# Patient Record
Sex: FEMALE | Race: WHITE
[De-identification: names, ages, dates, MRNs, and addresses within clinical notes are randomized per-mention and may not be internally consistent; named-entity substitution may affect disease eponyms.]

---

## 2018-03-24 ENCOUNTER — HEALTH MAINTENANCE LETTER (OUTPATIENT)
Age: 78
End: 2018-03-24

## 2018-09-18 ENCOUNTER — THERAPY VISIT (OUTPATIENT)
Dept: PHYSICAL THERAPY | Facility: CLINIC | Age: 78
End: 2018-09-18
Payer: MEDICARE

## 2018-09-18 DIAGNOSIS — G89.29 CHRONIC BILATERAL LOW BACK PAIN WITHOUT SCIATICA: ICD-10-CM

## 2018-09-18 DIAGNOSIS — M54.50 CHRONIC BILATERAL LOW BACK PAIN WITHOUT SCIATICA: ICD-10-CM

## 2018-09-18 PROCEDURE — G8978 MOBILITY CURRENT STATUS: HCPCS | Mod: GP | Performed by: PHYSICAL THERAPIST

## 2018-09-18 PROCEDURE — 97530 THERAPEUTIC ACTIVITIES: CPT | Mod: GP | Performed by: PHYSICAL THERAPIST

## 2018-09-18 PROCEDURE — 97110 THERAPEUTIC EXERCISES: CPT | Mod: GP | Performed by: PHYSICAL THERAPIST

## 2018-09-18 PROCEDURE — 97161 PT EVAL LOW COMPLEX 20 MIN: CPT | Mod: GP | Performed by: PHYSICAL THERAPIST

## 2018-09-18 PROCEDURE — G8979 MOBILITY GOAL STATUS: HCPCS | Mod: GP | Performed by: PHYSICAL THERAPIST

## 2018-09-18 NOTE — LETTER
DEPARTMENT OF HEALTH AND HUMAN SERVICES  CENTERS FOR MEDICARE & MEDICAID SERVICES    PLAN/UPDATED PLAN OF PROGRESS FOR OUTPATIENT REHABILITATION    PATIENTS NAME:  Sharmila Doty   : 1940    PROVID[facility] NUMBER:    [id number]  [facility]  [id number]     PROVIDER NAME: [place] [facility]  MEDICAL RECORD NUMBER: [id number]     START OF CARE DATE:  SOC Date: 18   TYPE:  PT    PRIMARY/TREATMENT DIAGNOSIS: (Pertinent Medical Diagnosis)  Chronic bilateral low back pain without sciatica    VISITS FROM START OF CARE:   1     [facility] Initial Evaluation -- Lumbar  Date: 2018  Sharmila Doty is a 78 year old female with a lumbar spine condition.   Referral: GP  Work mechanical stresses:  NA  Employment status:  Retired  Leisure mechanical stresses: Exercise class at [employer] 2 x week (has not done the past month due to noticing more pain during)  Functional disability score (JERRY/STarT Back):  See JERRY/STarT Back flow sheets  VAS score (0-10): 10/10 at worst  Patient goals/expectations:  Reduce pain in back and be able to return back to exercise class.    HISTORY:  Present symptoms: (B) low back  Pain quality (sharp/shooting/stabbing/aching/burning/cramping):  sharp   Paresthesia (yes/no):  No  Present since (onset date): 2018.  MD referral date 18 .     Symptoms (improving/unchanging/worsening):  worsening.   Symptoms commenced as a result of: No apparent reason   Condition occurred in the following environment:   Home   Symptoms at onset (back/thigh/leg): central low back  Constant symptoms (back/thigh/leg): None  Intermittent symptoms (back/thigh/leg): back  Symptoms are made worse with the following: Always Bending, Always Rising, Always Standing and Time of day - Always AM and Always PM   Symptoms are made better with the following: Other - heat/ice sometimes  Disturbed sleep (yes/no):  Yes   Sleeping postures  (prone/sup/side R/L): Lt side  Previous episodes (0/1-5/6-10/11+): 1   Year of first episode: 2015  Previous history: Yes  Previous treatments: PT 2015 - Ext responder  PATIENTS NAME:  Sharmila Doty   : 1940  PRIMARY/TREATMENT DIAGNOSIS: (Pertinent Medical Diagnosis)  Chronic bilateral low back pain without sciatica    Specific Questions:  Cough/Sneeze/Strain (pos/neg): Neg  Bowel/Bladder (normal/abnormal): Abnormal - Increased frequency and blood in urine but is following this with MD  Gait (normal/abnormal): Normal  Medications (nil/NSAIDS/analg/steroids/anticoag/other):  Muscle relaxants and Anticoag  Medical allergies:  Codeine  General health (excellent/good/fair/poor):  Excellent  Pertinent medical history:  Overweight and Calf pain, swelling, warmth (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)  Imaging (None/Xray/MRI/Other):  X-ray  Recent or major surgery (yes/no):  No  Night pain (yes/no): No  Accidents (yes/no): No  Unexplained weight loss (yes/no): No  Barriers at home: None  Other red flags: None    EXAMINATION    Posture:   Sitting (good/fair/poor): Fair  Standing (good/fair/poor):Fair  Lordosis (red/acc/normal): Reduced  Correction of posture (better/worse/no effect): Better  Lateral Shift (right/left/nil): Nil  Relevant (yes/no):  No  Other Observations: NA    Neurological:  Motor deficit:  NT  Reflexes:  NT  Sensory deficit:  NT  Dural signs:  Neg slump (B)    Movement Loss:   Rohit Mod Min Nil Pain   Flexion    X ERP   Extension  X   ERP   Side Gliding R  X   ERP   Side Gliding L  X   ERP     Test Movements:   During: produces, abolishes, increases, decreases, no effect, centralizing, peripheralizing   After: better, worse, no better, no worse, no effect, centralized, peripheralized          PATIENTS NAME:  Sharmila Doty   : 1940  PRIMARY/TREATMENT DIAGNOSIS: (Pertinent Medical Diagnosis)  Chronic bilateral low back pain without sciatica    Pretest  symptoms standin-3/10 (B) LB   Symptoms During Symptoms After ROM increased ROM decreased No Effect   FISit Increases    No Worse         Rep FISit Increases    Worse     X    EIS Increases    No Worse         Rep EIS Increases    No Worse    X     Pretest symptoms lying:     Symptoms During Symptoms After ROM increased ROM decreased No Effect   ALEX        Rep ALEX        EIL        Rep EIL        If required, pretest symptoms:    Symptoms During Symptoms After ROM increased ROM decreased No Effect   SGIS - R        Rep SGIS - R        SGIS - L        Rep SGIS - L          Static Tests:  Sitting slouched:    Sitting erect:    Standing slouched   Standing erect:    Lying prone in extension:   Long sitting:      Other Tests: NT    Provisional Classification:  Inconclusive/Other - Testing repeated EIS    Principle of Management:  Education:  Posture, specificity of exercise     Equipment provided:  None.  Use of rolled towel for posture correction  Mechanical therapy (Y/N):  Y   Extension principle:  EIS w/back against counter x 10-15 4-5 x daily                  PATIENTS NAME:  Sharmila Doty   : 1940  PRIMARY/TREATMENT DIAGNOSIS: (Pertinent Medical Diagnosis)  Chronic bilateral low back pain without sciatica    ASSESSMENT/PLAN:  Patient is a 78 year old female with lumbar complaints.    Patient has the following significant findings with corresponding treatment plan.                Diagnosis 1:  LBP  Pain -  self management, education, directional preference exercise and home program  Decreased ROM/flexibility - manual therapy, therapeutic exercise and home program  Decreased joint mobility - manual therapy, therapeutic exercise and home program  Decreased strength - therapeutic exercise, therapeutic activities and home program  Impaired muscle performance - neuro re-education and home program  Decreased function - therapeutic activities and home program  Impaired posture - neuro re-education and home  program    Therapy Evaluation Codes:   1) History comprised of:   Personal factors that impact the plan of care:      None.    Comorbidity factors that impact the plan of care are:      Overweight.     Medications impacting care: Heparin/coumadin and Muscle relaxant.  2) Examination of Body Systems comprised of:   Body structures and functions that impact the plan of care:      Lumbar spine.   Activity limitations that impact the plan of care are:      Bending, Standing, Walking and Rising from chair.  3) Clinical presentation characteristics are:   Evolving/Changing.  4) Decision-Making    Low complexity using standardized patient assessment instrument and/or   measureable assessment of functional outcome.  Cumulative Therapy Evaluation is: Low complexity.    Previous and current functional limitations:  (See Goal Flow Sheet for this information)    Short term and Long term goals: (See Goal Flow Sheet for this information)   Communication ability:  Patient appears to be able to clearly communicate and understand verbal and written communication and follow directions correctly.  Treatment Explanation - The following has been discussed with the patient:   RX ordered/plan of care, Anticipated outcomes, Possible risks and side effects                        PATIENTS NAME:  Sharmila Doty   : 1940  PRIMARY/TREATMENT DIAGNOSIS: (Pertinent Medical Diagnosis)  Chronic bilateral low back pain without sciatica    This patient would benefit from PT intervention to resume normal activities.   Rehab potential is good.  Frequency:  1 X week, once daily  Duration:  for 6 weeks  Discharge Plan:  Achieve all LTG.  Independent in home treatment program.  Reach maximal therapeutic benefit.          Caregiver Signature/Credentials _____________________________ Date ________         JOEL TreadwellT, Cert MDT     I have reviewed and certified the need for these services and plan of treatment while under my care.     "    PHYSICIAN'S SIGNATURE:   _________________________________________      Date___________   Familia Shine MD    Certification period:  Beginning of Cert date period: 09/18/18 to 12/16/18     Functional Level Progress Report: Please see attached \"Goal Flow sheet for Functional level.\"    ____X____ Continue Services or       ________ DC Services                Service dates: From  SOC Date: 09/18/18 to present                         "

## 2018-09-18 NOTE — PROGRESS NOTES
[facility] Initial Evaluation -- Lumbar    Date: September 18, 2018  Sharmila Doty is a 78 year old female with a lumbar spine condition.   Referral: GP  Work mechanical stresses:  NA  Employment status:  Retired  Leisure mechanical stresses: Exercise class at [employer] 2 x week (has not done the past month due to noticing more pain during)  Functional disability score (JERRY/STarT Back):  See JERRY/STarT Back flow sheets  VAS score (0-10): 10/10 at worst  Patient goals/expectations:  Reduce pain in back and be able to return back to exercise class.    HISTORY:    Present symptoms: (B) low back  Pain quality (sharp/shooting/stabbing/aching/burning/cramping):  sharp   Paresthesia (yes/no):  No    Present since (onset date): Jan 2018.  MD referral date 9.7.18 .     Symptoms (improving/unchanging/worsening):  worsening.     Symptoms commenced as a result of: No apparent reason   Condition occurred in the following environment:   Home     Symptoms at onset (back/thigh/leg): central low back  Constant symptoms (back/thigh/leg): None  Intermittent symptoms (back/thigh/leg): back    Symptoms are made worse with the following: Always Bending, Always Rising, Always Standing and Time of day - Always AM and Always PM   Symptoms are made better with the following: Other - heat/ice sometimes    Disturbed sleep (yes/no):  Yes Sleeping postures (prone/sup/side R/L): Lt side    Previous episodes (0/1-5/6-10/11+): 1 Year of first episode: 2015    Previous history: Yes  Previous treatments: PT 2015 - Ext responder      Specific Questions:  Cough/Sneeze/Strain (pos/neg): Neg  Bowel/Bladder (normal/abnormal): Abnormal - Increased frequency and blood in urine but is following this with MD  Gait (normal/abnormal): Normal  Medications (nil/NSAIDS/analg/steroids/anticoag/other):  Muscle relaxants and Anticoag  Medical allergies:  Codeine  General health (excellent/good/fair/poor):  Excellent  Pertinent medical  history:  Overweight and Calf pain, swelling, warmth (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)  Imaging (None/Xray/MRI/Other):  X-ray  Recent or major surgery (yes/no):  No  Night pain (yes/no): No  Accidents (yes/no): No  Unexplained weight loss (yes/no): No  Barriers at home: None  Other red flags: None    EXAMINATION    Posture:   Sitting (good/fair/poor): Fair  Standing (good/fair/poor):Fair  Lordosis (red/acc/normal): Reduced  Correction of posture (better/worse/no effect): Better    Lateral Shift (right/left/nil): Nil  Relevant (yes/no):  No  Other Observations: NA    Neurological:    Motor deficit:  NT  Reflexes:  NT  Sensory deficit:  NT  Dural signs:  Neg slump (B)    Movement Loss:   Rohit Mod Min Nil Pain   Flexion    X ERP   Extension  X   ERP   Side Gliding R  X   ERP   Side Gliding L  X   ERP     Test Movements:   During: produces, abolishes, increases, decreases, no effect, centralizing, peripheralizing   After: better, worse, no better, no worse, no effect, centralized, peripheralized    Pretest symptoms standin-3/10 (B) LB   Symptoms During Symptoms After ROM increased ROM decreased No Effect   FISit Increases    No Worse         Rep FISit Increases    Worse     X    EIS Increases    No Worse         Rep EIS Increases    No Worse    X     Pretest symptoms lying:     Symptoms During Symptoms After ROM increased ROM decreased No Effect   ALEX        Rep ALEX        EIL        Rep EIL        If required, pretest symptoms:    Symptoms During Symptoms After ROM increased ROM decreased No Effect   SGIS - R        Rep SGIS - R        SGIS - L        Rep SGIS - L          Static Tests:  Sitting slouched:    Sitting erect:    Standing slouched   Standing erect:    Lying prone in extension:   Long sitting:      Other Tests: NT    Provisional Classification:  Inconclusive/Other - Testing repeated EIS    Principle of Management:  Education:  Posture, specificity of  exercise   Equipment provided:  None.  Use of rolled towel for posture correction  Mechanical therapy (Y/N):  Y   Extension principle:  EIS w/back against counter x 10-15 4-5 x daily    ASSESSMENT/PLAN:    Patient is a 78 year old female with lumbar complaints.    Patient has the following significant findings with corresponding treatment plan.                Diagnosis 1:  LBP    Pain -  self management, education, directional preference exercise and home program  Decreased ROM/flexibility - manual therapy, therapeutic exercise and home program  Decreased joint mobility - manual therapy, therapeutic exercise and home program  Decreased strength - therapeutic exercise, therapeutic activities and home program  Impaired muscle performance - neuro re-education and home program  Decreased function - therapeutic activities and home program  Impaired posture - neuro re-education and home program    Therapy Evaluation Codes:   1) History comprised of:   Personal factors that impact the plan of care:      None.    Comorbidity factors that impact the plan of care are:      Overweight.     Medications impacting care: Heparin/coumadin and Muscle relaxant.  2) Examination of Body Systems comprised of:   Body structures and functions that impact the plan of care:      Lumbar spine.   Activity limitations that impact the plan of care are:      Bending, Standing, Walking and Rising from chair.  3) Clinical presentation characteristics are:   Evolving/Changing.  4) Decision-Making    Low complexity using standardized patient assessment instrument and/or measureable assessment of functional outcome.  Cumulative Therapy Evaluation is: Low complexity.    Previous and current functional limitations:  (See Goal Flow Sheet for this information)    Short term and Long term goals: (See Goal Flow Sheet for this information)     Communication ability:  Patient appears to be able to clearly communicate and understand verbal and written  communication and follow directions correctly.  Treatment Explanation - The following has been discussed with the patient:   RX ordered/plan of care  Anticipated outcomes  Possible risks and side effects  This patient would benefit from PT intervention to resume normal activities.   Rehab potential is good.    Frequency:  1 X week, once daily  Duration:  for 6 weeks  Discharge Plan:  Achieve all LTG.  Independent in home treatment program.  Reach maximal therapeutic benefit.    Please refer to the daily flowsheet for treatment today, total treatment time and time spent performing 1:1 timed codes.

## 2018-09-19 PROBLEM — M54.50 CHRONIC BILATERAL LOW BACK PAIN WITHOUT SCIATICA: Status: ACTIVE | Noted: 2018-09-19

## 2018-09-19 PROBLEM — G89.29 CHRONIC BILATERAL LOW BACK PAIN WITHOUT SCIATICA: Status: ACTIVE | Noted: 2018-09-19

## 2018-09-25 ENCOUNTER — THERAPY VISIT (OUTPATIENT)
Dept: PHYSICAL THERAPY | Facility: CLINIC | Age: 78
End: 2018-09-25
Payer: MEDICARE

## 2018-09-25 DIAGNOSIS — M54.50 CHRONIC BILATERAL LOW BACK PAIN WITHOUT SCIATICA: ICD-10-CM

## 2018-09-25 DIAGNOSIS — G89.29 CHRONIC BILATERAL LOW BACK PAIN WITHOUT SCIATICA: ICD-10-CM

## 2018-09-25 PROCEDURE — 97530 THERAPEUTIC ACTIVITIES: CPT | Mod: GP | Performed by: PHYSICAL THERAPIST

## 2018-09-25 PROCEDURE — 97110 THERAPEUTIC EXERCISES: CPT | Mod: GP | Performed by: PHYSICAL THERAPIST

## 2018-10-09 ENCOUNTER — THERAPY VISIT (OUTPATIENT)
Dept: PHYSICAL THERAPY | Facility: CLINIC | Age: 78
End: 2018-10-09
Payer: MEDICARE

## 2018-10-09 DIAGNOSIS — M54.50 CHRONIC BILATERAL LOW BACK PAIN WITHOUT SCIATICA: ICD-10-CM

## 2018-10-09 DIAGNOSIS — G89.29 CHRONIC BILATERAL LOW BACK PAIN WITHOUT SCIATICA: ICD-10-CM

## 2018-10-09 PROCEDURE — 97110 THERAPEUTIC EXERCISES: CPT | Mod: GP | Performed by: PHYSICAL THERAPIST

## 2019-11-07 PROBLEM — M54.50 CHRONIC BILATERAL LOW BACK PAIN WITHOUT SCIATICA: Status: RESOLVED | Noted: 2018-09-19 | Resolved: 2019-11-07

## 2019-11-07 PROBLEM — G89.29 CHRONIC BILATERAL LOW BACK PAIN WITHOUT SCIATICA: Status: RESOLVED | Noted: 2018-09-19 | Resolved: 2019-11-07

## 2019-11-07 NOTE — PROGRESS NOTES
Discharge Note    Progress reporting period is from initial eval to Oct 9, 2018.     Sharmila failed to return for next follow up visit and current status is unknown.  Please see information below for last relevant information on current status.  Patient seen for Rxs Used: 3 visits.  SUBJECTIVE  Subjective changes noted by patient:  Subjective: Pt reports that pain has been about the same since last visit, however, has been doing a lot around house with moving things to prepare to get new carpet installed.  States she gets to HEP 3-4 x daily but does seem to help when she is noticing pain.  .  Current pain level is Current Pain level: 8/10.     Previous pain level was  Initial Pain level: 10/10.   Changes in function:  Yes (See Goal flowsheet attached for changes in current functional level)  Adverse reaction to treatment or activity: None    OBJECTIVE  Changes noted in objective findings: Objective: See ROM below     ASSESSMENT/PLAN  Diagnosis: LBP   DIAGP:  The encounter diagnosis was Chronic bilateral low back pain without sciatica.  Updated problem list and treatment plan:   Pain - HEP  Decreased ROM/flexibility - HEP  Decreased strength - HEP  Impaired muscle performance - HEP  Decreased joint mobility - HEP  STG/LTGs have been met or progress has been made towards goals:  Yes, please see goal flowsheet for most current information  Assessment of Progress: current status is unknown.  Last current status: Assessment of progress: Pt is progressing slower than anticipated   Self Management Plans:  HEP  I have re-evaluated this patient and find that the nature, scope, duration and intensity of the therapy is appropriate for the medical condition of the patient.  Sharmila continues to require the following intervention to meet STG and LTG's:  HEP.    Recommendations:  Discharge with current home program.  Patient to follow up with MD as needed.    Please refer to the daily flowsheet for treatment today, total  treatment time and time spent performing 1:1 timed codes.